# Patient Record
Sex: MALE | Race: BLACK OR AFRICAN AMERICAN | NOT HISPANIC OR LATINO | Employment: STUDENT | URBAN - METROPOLITAN AREA
[De-identification: names, ages, dates, MRNs, and addresses within clinical notes are randomized per-mention and may not be internally consistent; named-entity substitution may affect disease eponyms.]

---

## 2017-11-22 ENCOUNTER — OFFICE VISIT (OUTPATIENT)
Dept: URGENT CARE | Facility: CLINIC | Age: 22
End: 2017-11-22

## 2017-11-22 VITALS
RESPIRATION RATE: 16 BRPM | OXYGEN SATURATION: 100 % | BODY MASS INDEX: 23.7 KG/M2 | WEIGHT: 175 LBS | HEIGHT: 72 IN | DIASTOLIC BLOOD PRESSURE: 68 MMHG | SYSTOLIC BLOOD PRESSURE: 117 MMHG | TEMPERATURE: 98 F | HEART RATE: 74 BPM

## 2017-11-22 DIAGNOSIS — N34.2 URETHRITIS: Primary | ICD-10-CM

## 2017-11-22 LAB
BILIRUB UR QL STRIP: NEGATIVE
GLUCOSE UR QL STRIP: NEGATIVE
KETONES UR QL STRIP: NEGATIVE
LEUKOCYTE ESTERASE UR QL STRIP: POSITIVE
PH, POC UA: 6 (ref 5–8)
POC BLOOD, URINE: NEGATIVE
POC NITRATES, URINE: NEGATIVE
PROT UR QL STRIP: POSITIVE
SP GR UR STRIP: 1.02 (ref 1–1.03)
UROBILINOGEN UR STRIP-ACNC: ABNORMAL (ref 0.3–2.2)

## 2017-11-22 PROCEDURE — 81003 URINALYSIS AUTO W/O SCOPE: CPT | Mod: QW,S$GLB,, | Performed by: FAMILY MEDICINE

## 2017-11-22 PROCEDURE — 99202 OFFICE O/P NEW SF 15 MIN: CPT | Mod: 25,S$GLB,, | Performed by: FAMILY MEDICINE

## 2017-11-22 PROCEDURE — 96372 THER/PROPH/DIAG INJ SC/IM: CPT | Mod: S$GLB,,, | Performed by: FAMILY MEDICINE

## 2017-11-22 RX ORDER — AZITHROMYCIN 500 MG/1
2000 TABLET, FILM COATED ORAL DAILY
Qty: 4 TABLET | Refills: 0 | Status: SHIPPED | OUTPATIENT
Start: 2017-11-22 | End: 2017-11-23

## 2017-11-22 RX ORDER — CEFTRIAXONE 250 MG/1
250 INJECTION, POWDER, FOR SOLUTION INTRAMUSCULAR; INTRAVENOUS
Status: COMPLETED | OUTPATIENT
Start: 2017-11-22 | End: 2017-11-22

## 2017-11-22 RX ADMIN — CEFTRIAXONE 250 MG: 250 INJECTION, POWDER, FOR SOLUTION INTRAMUSCULAR; INTRAVENOUS at 06:11

## 2017-11-23 NOTE — PROGRESS NOTES
Subjective:       Patient ID: Ant Mixon is a 22 y.o. male.    Vitals:    11/22/17 1808   BP: 117/68   Pulse: 74   Resp: 16   Temp: 98.4 °F (36.9 °C)       Chief Complaint: Abdominal Pain    Pt states lower abdominal pain and urinary discomfort x apprx 1 week. Pt suspects possible STD. Patient reports hx of GC infection in the past with similar symptoms. Describes purulent discharge from penis. + sexually active with one partner. Circumcised      Abdominal Pain   This is a new problem. The current episode started in the past 7 days. The problem occurs intermittently. The problem has been unchanged. The pain is located in the suprapubic region. The pain is at a severity of 6/10. The quality of the pain is cramping. The abdominal pain radiates to the suprapubic region. Associated symptoms include dysuria. Pertinent negatives include no constipation, diarrhea, fever, hematochezia, melena, nausea or vomiting. Nothing aggravates the pain. The pain is relieved by nothing. He has tried nothing for the symptoms.     Review of Systems   Constitution: Negative for chills and fever.   Cardiovascular: Negative for chest pain.   Respiratory: Negative for shortness of breath.    Musculoskeletal: Negative for back pain.   Gastrointestinal: Positive for abdominal pain. Negative for constipation, diarrhea, hematochezia, melena, nausea and vomiting.   Genitourinary: Positive for dysuria.       Objective:      Physical Exam   Constitutional: He appears well-developed and well-nourished.   HENT:   Head: Normocephalic and atraumatic.   Eyes: EOM are normal. Pupils are equal, round, and reactive to light.   Cardiovascular: Normal rate, regular rhythm and normal heart sounds.    Pulmonary/Chest: Effort normal and breath sounds normal.   Abdominal: Soft.   Nursing note and vitals reviewed.      Assessment:       1. Urethritis        Plan:       Ant was seen today for abdominal pain.    Diagnoses and all orders for this  visit:    Urethritis  -     POCT Urinalysis, Dipstick, Automated, W/O Scope  -     cefTRIAXone injection 250 mg; Inject 250 mg into the muscle one time.  -     C. trachomatis/N. gonorrhoeae by AMP DNA Urine  -     Urine culture  -     azithromycin (ZITHROMAX) 500 MG tablet; Take 4 tablets (2,000 mg total) by mouth once daily.

## 2017-11-23 NOTE — PATIENT INSTRUCTIONS
Treated Urethritis Due to Gonorrhea (Male)    You have urethritis. This is an inflammation in the urethra. The urethra is the tube between the bladder and the tip of the penis. Urine drains out of the body through the urethra.  Urethritis has both infectious and non-infectious causes. It is most often caused by gonorrhea, chlamydia, or both. The cause of your infection is gonorrhea. This is a sexually transmitted disease, or STD.  Women often do not have symptoms at the start of an infection, but men usually do. Symptoms usually start within 1 week after infection, but can take up to a month. Symptoms can include:  · Burning or pain when urinating  · Irritation in the penis  · Pus discharge from the penis  · Pain and possible swelling in one or both testicles  When the infection is also in other areas it can cause:  · Rectal pain and discharge  · Throat Infections  · Eye infections (conjunctivitis)  If not treated, your infection can get worse. It can spread to other parts of your body. It can also cause rashes, arthritis, and infections in your joints, heart, and brain.  Gonorrhea needs to be treated with antibiotics. This is important, so it does not get worse and spread. Since it is contagious, you need treatment so you do not give it to someone else. If you give it to a woman, it can cause pelvic inflammatory disease (PID) and infertility.  Since people can have gonorrhea without symptoms, your sexual partner(s) also need to be treated, even if they have no symptoms. If they are not treated, and you continue to have sex with them, you will be infected again. Your sexual partner(s) should contact their own healthcare providers or go to an urgent care clinic or the Public Health Department to be examined and treated.  Home care  These guidelines can help you care for yourself at home:  · Take all of the antibiotics you were given until they are used up. It is important to finish the antibiotics, even if your  symptoms are gone. This is to make sure the infection has cleared.  · Do not have sex until both you and your partner(s) have finished all the antibiotics, and your healthcare provider says you are no longer contagious.  · You can take over-the-counter medicine for pain, unless you were given a different pain medicine. If you have chronic liver or kidney disease or ever had a stomach ulcer or gastrointestinal bleeding, or are taking blood thinner medicines, talk with your healthcare provider before using these medicines.  · Aspirin should never be used in anyone under 18 years of age who has a fever.  · Learn about and use safe sex practices. The safest sex is with a partner who has tested negative and only has sex with you. Condoms can stop some STDs from spreading, including gonorrhea, chlamydia and HIV, but are not a guarantee.  Follow-up care  Follow up with your healthcare provider, or as advised. If a culture test was taken, you may call for the results as advised. Another culture test should be done 4 to 6 weeks after treatment to be sure the infection is gone. Follow up with your healthcare provider or the public health department for a complete STD screening, including HIV testing. For more information about STDs, contact CDC-INFO at 562-756-2189.  When to seek medical advice  Call your healthcare provider right away if any of these occur:  · No improvement after 3 days of treatment  · Unable to urinate  · Rash or joint pain  · Painful sores on the penis  · Enlarged painful lymph nodes (lumps) in the groin  · Testicle pain or swelling of the scrotum  Date Last Reviewed: 10/1/2016  © 8890-4108 [employer]. [address] [place] [address], [place] [address]. All rights reserved. This information is not intended as a substitute for professional medical care. Always follow your healthcare professional's instructions.

## 2017-11-28 LAB
BACTERIA UR CULT: NO GROWTH
BACTERIA UR CULT: NORMAL
C TRACH RRNA SPEC QL NAA+PROBE: POSITIVE
N GONORRHOEA RRNA SPEC QL NAA+PROBE: NEGATIVE

## 2019-08-16 PROBLEM — S62.022A: Status: ACTIVE | Noted: 2019-08-16

## 2019-08-16 PROBLEM — R52 PAIN: Status: ACTIVE | Noted: 2019-08-16

## 2019-10-03 PROBLEM — K59.00 CONSTIPATION: Status: ACTIVE | Noted: 2019-10-03

## 2019-10-03 PROBLEM — R10.12 LUQ PAIN: Status: ACTIVE | Noted: 2019-10-03
